# Patient Record
Sex: MALE | Race: WHITE | NOT HISPANIC OR LATINO | Employment: FULL TIME | URBAN - METROPOLITAN AREA
[De-identification: names, ages, dates, MRNs, and addresses within clinical notes are randomized per-mention and may not be internally consistent; named-entity substitution may affect disease eponyms.]

---

## 2022-06-30 ENCOUNTER — HOSPITAL ENCOUNTER (EMERGENCY)
Facility: HOSPITAL | Age: 37
Discharge: HOME | End: 2022-06-30
Attending: EMERGENCY MEDICINE | Admitting: EMERGENCY MEDICINE

## 2022-06-30 VITALS
TEMPERATURE: 99.7 F | RESPIRATION RATE: 18 BRPM | BODY MASS INDEX: 27.47 KG/M2 | WEIGHT: 175 LBS | OXYGEN SATURATION: 100 % | SYSTOLIC BLOOD PRESSURE: 122 MMHG | HEIGHT: 67 IN | DIASTOLIC BLOOD PRESSURE: 82 MMHG | HEART RATE: 79 BPM

## 2022-06-30 DIAGNOSIS — S61.412A LACERATION OF LEFT HAND WITHOUT FOREIGN BODY, INITIAL ENCOUNTER: Primary | ICD-10-CM

## 2022-06-30 PROCEDURE — 99282 EMERGENCY DEPT VISIT SF MDM: CPT

## 2022-06-30 PROCEDURE — 25010000002 TETANUS-DIPHTH-ACELL PERTUSSIS 5-2.5-18.5 LF-MCG/0.5 SUSPENSION PREFILLED SYRINGE: Performed by: EMERGENCY MEDICINE

## 2022-06-30 PROCEDURE — 90715 TDAP VACCINE 7 YRS/> IM: CPT | Performed by: EMERGENCY MEDICINE

## 2022-06-30 PROCEDURE — 12001 RPR S/N/AX/GEN/TRNK 2.5CM/<: CPT | Performed by: EMERGENCY MEDICINE

## 2022-06-30 PROCEDURE — 90471 IMMUNIZATION ADMIN: CPT | Performed by: EMERGENCY MEDICINE

## 2022-06-30 RX ORDER — CEPHALEXIN 500 MG/1
500 CAPSULE ORAL 3 TIMES DAILY
Qty: 21 CAPSULE | Refills: 0 | Status: SHIPPED | OUTPATIENT
Start: 2022-06-30

## 2022-06-30 RX ADMIN — TETANUS TOXOID, REDUCED DIPHTHERIA TOXOID AND ACELLULAR PERTUSSIS VACCINE, ADSORBED 0.5 ML: 5; 2.5; 8; 8; 2.5 SUSPENSION INTRAMUSCULAR at 22:22

## 2022-07-01 NOTE — DISCHARGE INSTRUCTIONS
Sutures need to be removed in roughly 8 to 10 days.  Do not hesitate to return for increased redness, swelling, pain or as needed.

## 2022-07-01 NOTE — ED PROVIDER NOTES
EMERGENCY DEPARTMENT ENCOUNTER    Room Number:  13/13  Date of encounter:  6/30/2022  PCP: Dheeraj Aguilar MD  Historian: Patient, girlfriend at bedside    I used full protective equipment while examining this patient.  This includes face mask, gloves and protective eyewear.  I washed my hands before entering the room and immediately upon leaving the room      HPI:  Chief Complaint: Laceration  A complete HPI/ROS/PMH/PSH/SH/FH are unobtainable due to: None    Context: Esdras Stanley is a 36 y.o. male who presents to the ED c/o laceration.  Patient was working on his boat engine when he inadvertently hit his left hand against a piece of sharp metal.  He sustained a laceration to the left hand.  Pain is moderate.  Patient denies any other injury.  He is left-handed.  His tetanus is not up-to-date.      MEDICAL RECORD REVIEW  I reviewed prior medical records note the patient was seen in 2015 with stenosing synovitis of the wrist.    PAST MEDICAL HISTORY  Active Ambulatory Problems     Diagnosis Date Noted   • No Active Ambulatory Problems     Resolved Ambulatory Problems     Diagnosis Date Noted   • No Resolved Ambulatory Problems     No Additional Past Medical History         PAST SURGICAL HISTORY  History reviewed. No pertinent surgical history.      FAMILY HISTORY  History reviewed. No pertinent family history.      SOCIAL HISTORY  Social History     Socioeconomic History   • Marital status: Single   Tobacco Use   • Smoking status: Current Every Day Smoker     Packs/day: 1.00   Substance and Sexual Activity   • Alcohol use: Yes     Alcohol/week: 10.0 standard drinks     Types: 10 Cans of beer per week   • Drug use: No         ALLERGIES  Patient has no known allergies.       REVIEW OF SYSTEMS  Review of Systems   Constitutional: Negative for fever.   Respiratory: Negative for shortness of breath.    Cardiovascular: Negative for chest pain.           PHYSICAL EXAM    I have reviewed the triage vital signs and  nursing notes.    ED Triage Vitals [06/30/22 2150]   Temp Heart Rate Resp BP SpO2   99.7 °F (37.6 °C) 79 18 122/82 100 %      Temp src Heart Rate Source Patient Position BP Location FiO2 (%)   Oral Monitor Sitting Right arm --       Physical Exam  GENERAL: Alert male no obvious distress.  Triage vitals reviewed and are unremarkable.  HENT: nares patent  EYES: no scleral icterus  CV: regular rhythm, regular rate  RESPIRATORY: normal effort, clear to auscultation bilaterally  ABDOMEN: soft  MUSCULOSKELETAL: no deformity  NEURO: Strength sensation and coordination are grossly intact.  Speech and mentation are unremarkable.  Strength sensation are intact throughout the left hand as is his two-point discrimination.  SKIN: Examination of the left hand reveals a 2.5 cm laceration laterally on the hypothenar eminence.  There is some mild surrounding swelling and superficial abrasion.      LAB RESULTS  No results found for this or any previous visit (from the past 24 hour(s)).    Ordered the above labs and independently reviewed the results.      RADIOLOGY  No Radiology Exams Resulted Within Past 24 Hours    I ordered the above noted radiological studies. Reviewed by me and discussed with radiologist.  See dictation for official radiology interpretation.      PROCEDURES  Laceration Repair    Date/Time: 6/30/2022 10:14 PM  Performed by: Vimal Thomas MD  Authorized by: Vimal Thomas MD     Consent:     Consent obtained:  Emergent situation    Risks discussed:  Pain and infection  Anesthesia:     Anesthesia method:  Local infiltration    Local anesthetic:  Bupivacaine 0.5% w/o epi  Laceration details:     Location:  Hand    Hand location:  L palm    Length (cm):  2.5  Pre-procedure details:     Preparation:  Patient was prepped and draped in usual sterile fashion  Exploration:     Wound exploration: wound explored through full range of motion and entire depth of wound visualized    Treatment:     Area cleansed with:   Chlorhexidine    Amount of cleaning:  Standard    Irrigation method:  Pressure wash    Debridement:  None    Undermining:  None  Skin repair:     Repair method:  Sutures    Suture size:  5-0    Suture material:  Prolene    Suture technique:  Simple interrupted  Approximation:     Approximation:  Close  Repair type:     Repair type:  Simple  Post-procedure details:     Dressing:  Open (no dressing)    Procedure completion:  Tolerated well, no immediate complications          MEDICATIONS GIVEN IN ER    Medications   Tetanus-Diphth-Acell Pertussis (BOOSTRIX) injection 0.5 mL (0.5 mL Intramuscular Given 6/30/22 2222)         PROGRESS, DATA ANALYSIS, CONSULTS, AND MEDICAL DECISION MAKING    All labs have been independently reviewed by me.  All radiology studies have been reviewed by me and discussed with radiologist dictating the report.   EKG's independently viewed and interpreted by me.  Discussion below represents my analysis of pertinent findings related to patient's condition, differential diagnosis, treatment plan and final disposition.      ED Course as of 06/30/22 2240   Thu Jun 30, 2022 2214 MDM-patient cut with strip of strong metal so I do not believe that there is a need for x-ray to exclude foreign body.  He is not up-to-date on tetanus and we will go ahead and give him this.    Patient's wound occurred near a dirty [place] and so we will go ahead and cover with antibiotics.  He may be at increased risk of infection due to contamination present during laceration.  Wound was irrigated extensively at bedside. [DB]      ED Course User Index  [DB] Vimal Thomas MD       AS OF 22:40 EDT VITALS:    BP - 122/82  HR - 79  TEMP - 99.7 °F (37.6 °C) (Oral)  O2 SATS - 100%      DIAGNOSIS  Final diagnoses:   Laceration of left hand without foreign body, initial encounter         DISPOSITION  DISCHARGE    Patient discharged in stable condition.    Reviewed implications of results, diagnosis, meds, responsibility  to follow up, warning signs and symptoms of possible worsening, potential complications and reasons to return to ER, including increased pain, swelling or as needed.    Patient/Family voiced understanding of above instructions.    Discussed plan for discharge, as there is no emergent indication for admission. Patient referred to primary care provider for BP management due to today's BP. Pt/family is agreeable and understands need for follow up and repeat testing.  Pt is aware that discharge does not mean that nothing is wrong but it indicates no emergency is present that requires admission and they must continue care with follow-up as given below or physician of their choice.     FOLLOW-UP  Dheeraj Aguilar MD  [address] [facility]  [address]  [facility] [address] [phone]      Suture removal in 8 to 10 days         Medication List      New Prescriptions    cephalexin 500 MG capsule  Commonly known as: KEFLEX  Take 1 capsule by mouth 3 (Three) Times a Day.           Where to Get Your Medications      These medications were sent to [facility]/pharmacy #[id number] - [place], [place] - [address] [facility] - [phone]  - [phone]   [address] [facility] [address]    Phone: [phone]   · cephalexin 500 MG capsule                Vimal Thomas MD  06/30/22 0955